# Patient Record
Sex: FEMALE | URBAN - METROPOLITAN AREA
[De-identification: names, ages, dates, MRNs, and addresses within clinical notes are randomized per-mention and may not be internally consistent; named-entity substitution may affect disease eponyms.]

---

## 2020-01-29 ENCOUNTER — TELEPHONE (OUTPATIENT)
Dept: RHEUMATOLOGY | Facility: CLINIC | Age: 85
End: 2020-01-29

## 2020-01-29 NOTE — TELEPHONE ENCOUNTER
Called and spoke with pt regarding referral for Rheumatology here at the [facility], discussed where we are located, she has a rheumatologist and ophthalmologist who are closer.  She prefers to stay with them at this time. She is currently receiving treatment from them.    Let her know she can contact our program at any time if she wishes to be seen here.    Amelie Borrego RN  [facility]